# Patient Record
Sex: MALE | HISPANIC OR LATINO | Employment: FULL TIME | URBAN - METROPOLITAN AREA
[De-identification: names, ages, dates, MRNs, and addresses within clinical notes are randomized per-mention and may not be internally consistent; named-entity substitution may affect disease eponyms.]

---

## 2021-11-24 ENCOUNTER — HOSPITAL ENCOUNTER (EMERGENCY)
Facility: HOSPITAL | Age: 26
Discharge: HOME | End: 2021-11-24
Attending: EMERGENCY MEDICINE | Admitting: EMERGENCY MEDICINE

## 2021-11-24 VITALS
RESPIRATION RATE: 16 BRPM | HEART RATE: 69 BPM | OXYGEN SATURATION: 100 % | BODY MASS INDEX: 25.36 KG/M2 | SYSTOLIC BLOOD PRESSURE: 131 MMHG | TEMPERATURE: 98.5 F | HEIGHT: 68 IN | DIASTOLIC BLOOD PRESSURE: 61 MMHG | WEIGHT: 167.33 LBS

## 2021-11-24 DIAGNOSIS — S76.012A STRAIN OF FLEXOR MUSCLE OF LEFT HIP, INITIAL ENCOUNTER: Primary | ICD-10-CM

## 2021-11-24 PROCEDURE — 99282 EMERGENCY DEPT VISIT SF MDM: CPT

## 2021-11-24 RX ORDER — CYCLOBENZAPRINE HCL 10 MG
10 TABLET ORAL 2 TIMES DAILY PRN
Qty: 15 TABLET | Refills: 0 | Status: SHIPPED | OUTPATIENT
Start: 2021-11-24

## 2021-11-24 NOTE — ED PROVIDER NOTES
Subjective   Pt was sprinting this AM and had immediate pain to front of left hip, pain with walking. No previous issues in past.       History provided by:  Patient  Hip Pain  Location:  Left  Severity:  Mild  Onset quality:  Gradual  Duration:  2 hours  Timing:  Intermittent  Progression:  Waxing and waning  Chronicity:  New  Associated symptoms: no fatigue and no fever        Review of Systems   Constitutional: Negative for appetite change, chills, fatigue and fever.   HENT: Negative.    Eyes: Negative.  Negative for photophobia.   Respiratory: Negative.    Gastrointestinal: Negative.    Endocrine: Negative.    Genitourinary: Negative.    Musculoskeletal: Positive for gait problem.   Skin: Negative.    Allergic/Immunologic: Negative.  Negative for immunocompromised state.   Hematological: Negative.    Psychiatric/Behavioral: Negative.    All other systems reviewed and are negative.      History reviewed. No pertinent past medical history.    No Known Allergies    History reviewed. No pertinent surgical history.    History reviewed. No pertinent family history.    Social History     Socioeconomic History   • Marital status: Single   Tobacco Use   • Smoking status: Never Smoker   • Smokeless tobacco: Never Used   Substance and Sexual Activity   • Alcohol use: Defer   • Drug use: Defer   • Sexual activity: Defer           Objective   Physical Exam  Vitals and nursing note reviewed.   Constitutional:       General: He is not in acute distress.     Appearance: Normal appearance. He is not ill-appearing or toxic-appearing.   HENT:      Head: Normocephalic and atraumatic.   Pulmonary:      Effort: Pulmonary effort is normal.   Musculoskeletal:        Legs:    Neurological:      Mental Status: He is alert and oriented to person, place, and time. Mental status is at baseline.   Psychiatric:         Mood and Affect: Mood normal.         Behavior: Behavior normal.         Thought Content: Thought content normal.          Judgment: Judgment normal.           MDM  Number of Diagnoses or Management Options  Risk of Complications, Morbidity, and/or Mortality  Presenting problems: low  Diagnostic procedures: low  Management options: low    Patient Progress  Patient progress: stable      Final diagnoses:   Strain of flexor muscle of left hip, initial encounter       ED Disposition  ED Disposition     ED Disposition Condition Comment    Discharge Alexandr Yost MD  [address]  [place] [address]  [phone]    Schedule an appointment as soon as possible for a visit in 1 week  for further eval         Medication List      New Prescriptions    cyclobenzaprine 10 MG tablet  Commonly known as: FLEXERIL  Take 1 tablet by mouth 2 (Two) Times a Day As Needed for Muscle Spasms.     diclofenac 50 MG EC tablet  Commonly known as: VOLTAREN  Take 1 tablet by mouth 3 (Three) Times a Day.           Where to Get Your Medications      You can get these medications from any pharmacy    Bring a paper prescription for each of these medications  · cyclobenzaprine 10 MG tablet  · diclofenac 50 MG EC tablet          Harjeet Pritchard PA  11/24/21 7113

## 2021-12-15 ENCOUNTER — OFFICE VISIT (OUTPATIENT)
Dept: ORTHOPEDIC SURGERY | Facility: CLINIC | Age: 26
End: 2021-12-15

## 2021-12-15 VITALS — WEIGHT: 167 LBS | HEIGHT: 68 IN | BODY MASS INDEX: 25.31 KG/M2

## 2021-12-15 DIAGNOSIS — M25.552 LEFT HIP PAIN: ICD-10-CM

## 2021-12-15 DIAGNOSIS — M76.892 TENDINITIS OF LEFT HIP FLEXOR: Primary | ICD-10-CM

## 2021-12-15 PROCEDURE — 99203 OFFICE O/P NEW LOW 30 MIN: CPT | Performed by: ORTHOPAEDIC SURGERY

## 2021-12-15 RX ORDER — DICLOFENAC SODIUM 75 MG/1
75 TABLET, DELAYED RELEASE ORAL 2 TIMES DAILY
Qty: 60 TABLET | Refills: 0 | Status: SHIPPED | OUTPATIENT
Start: 2021-12-15

## 2021-12-15 NOTE — PROGRESS NOTES
"Chief Complaint  Initial Evaluation of the Left Hip     Subjective      Robert Rodarte presents to [facility] for an evaluation of left hip. Patient states he was conducting PT 2 weeks ago and strained his left hip. He didn't have any specific injury during his PT run. He states since then he gets a popping sensation in the hip that doesn’t cause him pain. He also has discomfort with doing sit ups.  Whenever he raises his leg, he gets this popping sensation. He also does have pain in the hip. He went to the ED and was prescribed medication that has given him some relief. Patient points to the anterior/lateral aspect of the hip.     No Known Allergies     Social History     Socioeconomic History   • Marital status: Single   Tobacco Use   • Smoking status: Never Smoker   • Smokeless tobacco: Never Used   Vaping Use   • Vaping Use: Never used   Substance and Sexual Activity   • Alcohol use: Defer   • Drug use: Defer   • Sexual activity: Defer        Review of Systems     Objective   Vital Signs:   Ht 172.7 cm (68\")   Wt 75.8 kg (167 lb)   BMI 25.39 kg/m²       Physical Exam  Constitutional:       Appearance: Normal appearance. Patient is well-developed and normal weight.   HENT:      Head: Normocephalic.      Right Ear: Hearing and external ear normal.      Left Ear: Hearing and external ear normal.      Nose: Nose normal.   Eyes:      Conjunctiva/sclera: Conjunctivae normal.   Cardiovascular:      Rate and Rhythm: Normal rate.   Pulmonary:      Effort: Pulmonary effort is normal.      Breath sounds: No wheezing or rales.   Abdominal:      Palpations: Abdomen is soft.      Tenderness: There is no abdominal tenderness.   Musculoskeletal:      Cervical back: Normal range of motion.   Skin:     Findings: No rash.   Neurological:      Mental Status: Patient  is alert and oriented to person, place, and time.   Psychiatric:         Mood and Affect: Mood and affect normal.         " Judgment: Judgment normal.       Ortho Exam      LEFT HIP: Good tone of hip flexors, hip extensors, hip adductor, hip abductors. Good strength to hamstrings, quadriceps, dorsiflexors and plantar flexors. Popping about the ilios soleus tendon. Sensation grossly intact. Neurovascular intact.  Dorsal Pedal Pulse 2+, posterior tibialis pulse 2+. Calf supple, non-tender, no signs of DVT. Full weight bearing. Non-antalgic gait. Mild tenderness of the hip flexor. No groin pain. Internal rotation with mild pain.       Procedures        Imaging Results (Most Recent)     Procedure Component Value Units Date/Time    XR Hip With or Without Pelvis 2 - 3 View Left [[id number]] Resulted: 12/15/21 0818     Updated: 12/15/21 0822           Result Review :     X-Ray Report:  Left hip(s) X-Ray  Indication: Evaluation of left hip pain   AP and Lateral view(s)  Findings: No acute fracture or dislocation noted.   Prior studies available for comparison: no              Assessment and Plan     DX: Left hip flexor tendinitis     Discussed treatment plans and diagnosis with the patient. Discussed resting from running for 3-4 weeks and physical therapy. Patient agrees with this plan and wishes to proceed. A prescription for PT given. Patient opted for activity modification with running. He wishes to run at his own pace. If he needs a note for this, he may call our office back and obtain this. If failing to improve, follow-up. Patient placed on an anti-inflammatory.     Call or return if worsening symptoms.    Follow Up     4 weeks if failing to improve.       Patient was given instructions and counseling regarding his condition or for health maintenance advice. Please see specific information pulled into the AVS if appropriate.     Scribed for Alexandr Gordon MD by Malika Dan.  12/15/21   08:16 EST    I have personally performed the services described in this document as scribed by the above individual and it is both accurate and  complete. Alexandr Gordon MD 12/17/21